# Patient Record
(demographics unavailable — no encounter records)

---

## 2024-12-18 NOTE — PHYSICAL EXAM
[Well Developed] : well developed [Well Nourished] : well nourished [No Acute Distress] : no acute distress [Normal Venous Pressure] : normal venous pressure [No Carotid Bruit] : no carotid bruit [Normal S1, S2] : normal S1, S2 [No Murmur] : no murmur [No Rub] : no rub [No Gallop] : no gallop [Clear Lung Fields] : clear lung fields [Good Air Entry] : good air entry [No Respiratory Distress] : no respiratory distress  [Soft] : abdomen soft [Non Tender] : non-tender [No Masses/organomegaly] : no masses/organomegaly [Normal Bowel Sounds] : normal bowel sounds [Normal Gait] : normal gait [No Edema] : no edema [No Cyanosis] : no cyanosis [No Clubbing] : no clubbing [No Varicosities] : no varicosities [No Rash] : no rash [No Skin Lesions] : no skin lesions [Moves all extremities] : moves all extremities [No Focal Deficits] : no focal deficits [Normal Speech] : normal speech [Alert and Oriented] : alert and oriented [Normal memory] : normal memory [General Appearance - Well Developed] : well developed [Normal Appearance] : normal appearance [Well Groomed] : well groomed [General Appearance - Well Nourished] : well nourished [No Deformities] : no deformities [General Appearance - In No Acute Distress] : no acute distress [Normal Conjunctiva] : the conjunctiva exhibited no abnormalities [Eyelids - No Xanthelasma] : the eyelids demonstrated no xanthelasmas [Normal Oral Mucosa] : normal oral mucosa [No Oral Pallor] : no oral pallor [No Oral Cyanosis] : no oral cyanosis [Normal Jugular Venous A Waves Present] : normal jugular venous A waves present [Normal Jugular Venous V Waves Present] : normal jugular venous V waves present [No Jugular Venous Conroy A Waves] : no jugular venous conroy A waves [Respiration, Rhythm And Depth] : normal respiratory rhythm and effort [Exaggerated Use Of Accessory Muscles For Inspiration] : no accessory muscle use [Auscultation Breath Sounds / Voice Sounds] : lungs were clear to auscultation bilaterally [Heart Rate And Rhythm] : heart rate and rhythm were normal [Heart Sounds] : normal S1 and S2 [Murmurs] : no murmurs present [Abdomen Soft] : soft [Abdomen Tenderness] : non-tender [Abdomen Mass (___ Cm)] : no abdominal mass palpated [Abnormal Walk] : normal gait [Gait - Sufficient For Exercise Testing] : the gait was sufficient for exercise testing [Nail Clubbing] : no clubbing of the fingernails [Cyanosis, Localized] : no localized cyanosis [Petechial Hemorrhages (___cm)] : no petechial hemorrhages [Skin Color & Pigmentation] : normal skin color and pigmentation [] : no rash [No Venous Stasis] : no venous stasis [Skin Lesions] : no skin lesions [No Skin Ulcers] : no skin ulcer [No Xanthoma] : no  xanthoma was observed [Oriented To Time, Place, And Person] : oriented to person, place, and time [Affect] : the affect was normal [Mood] : the mood was normal [No Anxiety] : not feeling anxious

## 2024-12-18 NOTE — REASON FOR VISIT
[Follow-Up - Clinic] : a clinic follow-up of [Coronary Artery Disease] : coronary artery disease [FreeTextEntry2] : Status post PCI

## 2024-12-18 NOTE — HISTORY OF PRESENT ILLNESS
[FreeTextEntry1] : Status post PCI in 1998\par  Hypertension\par  Hyperlipidemia\par  Exertional chest discomfort-class II\par  No shortness of breath or CHF\par  Last echocardiogram was in December of 2014. There was trace mitral insufficiency. EF was 60%.\par  Last stress test was in December 2 does of 14 which was normal.\par  Presents today for followup care. He is class I in terms of chest discomfort or shortness of breath.\par  He offers no new cardiac complaints.\par  Hypertension is controlled\par  He is taking his medications.\par  \par  stress test is neg\par  echo shows nl lv function\par  since last visit, patient offers no new complaints\par  \par  pci done  20 years ago\par  doing well\par  no new cardiac complaints\par  2021 echo and stress test were normal\par  EKG normal sinus rhythm\par  \par  The patient is quite active and playing tennis and reports no cardiac symptoms\par

## 2025-03-27 NOTE — ASSESSMENT
[FreeTextEntry1] : Mr. PIEDAD SIDDIQUI is a 85 year old man with PMHx of CAD s/p PCI in 1998, HTN, HLD who is presenting for follow up.   -Reviewed history -Continue ASA 81 daily and simvastatin 40 daily for CAD -Continue enalapril 20 daily for HTN, which is well controlled -Order carotid US in the future  Time in encounter, including face to face visit and time reviewing chart:  33 minutes  Russel Mays MD, FACC Non-Invasive Cardiology [address], [address] Office: [phone]

## 2025-03-27 NOTE — ASSESSMENT
[FreeTextEntry1] : Mr. PIEDAD SIDDIQUI is a 85 year old man with PMHx of CAD s/p PCI in 1998, HTN, HLD who is presenting for follow up.   -Reviewed history -Continue ASA 81 daily and simvastatin 40 daily for CAD -Continue enalapril 20 daily for HTN, which is well controlled -Order carotid US in the future  Time in encounter, including face to face visit and time reviewing chart:  33 minutes  Russel Mays MD, FACC Non-Invasive Cardiology [address], [address] Office: [phone]

## 2025-03-27 NOTE — REASON FOR VISIT
[Hyperlipidemia] : hyperlipidemia [Hypertension] : hypertension [Coronary Artery Disease] : coronary artery disease [FreeTextEntry1] : Mr. PIEDAD SIDDIQUI is a 85 year old man with PMHx of CAD s/p PCI in 1998, HTN, HLD who is presenting for follow up. He previously followed with Dr Meng. He feels well today. He has no chest pain, SOB or palpitations. He has no limitations with exertion. He plays tennis regularly.  TTE 10/14/21 Summary: 1.Left ventricular ejection fraction is estimated at 60-65%. 2.Normal global left ventricular systolic function. 3.Spectral Doppler shows impaired relaxation pattern of left ventricular myocardial filling (Grade I diastolic dysfunction). 4.Mildly calcified aortic valve without stenosis  9/22/21 Impression: 1. Negative stress test for exercise induced ischemia with respect to symptoms at a moderate workload. 2. Negative stress test for exercise induced ischemia with respect to electrocardiographic changes at a moderate workload. 3. Myocardial imaging reveals no evidence of ischemia or infarction 4. Gated imaging reveals normal wall motion thickening and ejection fraction Recommendation: Medical therapy.